# Patient Record
Sex: MALE
[De-identification: names, ages, dates, MRNs, and addresses within clinical notes are randomized per-mention and may not be internally consistent; named-entity substitution may affect disease eponyms.]

---

## 2017-08-22 ENCOUNTER — APPOINTMENT (OUTPATIENT)
Dept: GASTROENTEROLOGY | Facility: CLINIC | Age: 62
End: 2017-08-22
Payer: COMMERCIAL

## 2017-08-22 VITALS
DIASTOLIC BLOOD PRESSURE: 62 MMHG | TEMPERATURE: 99 F | HEIGHT: 67 IN | WEIGHT: 180 LBS | BODY MASS INDEX: 28.25 KG/M2 | SYSTOLIC BLOOD PRESSURE: 100 MMHG

## 2017-08-22 PROCEDURE — 99213 OFFICE O/P EST LOW 20 MIN: CPT

## 2018-01-13 ENCOUNTER — TRANSCRIPTION ENCOUNTER (OUTPATIENT)
Age: 63
End: 2018-01-13

## 2018-09-04 ENCOUNTER — APPOINTMENT (OUTPATIENT)
Dept: UROLOGY | Facility: CLINIC | Age: 63
End: 2018-09-04
Payer: COMMERCIAL

## 2018-09-04 PROCEDURE — 99204 OFFICE O/P NEW MOD 45 MIN: CPT

## 2018-09-06 ENCOUNTER — RECORD ABSTRACTING (OUTPATIENT)
Age: 63
End: 2018-09-06

## 2018-09-10 ENCOUNTER — OTHER (OUTPATIENT)
Age: 63
End: 2018-09-10

## 2018-09-15 ENCOUNTER — APPOINTMENT (OUTPATIENT)
Dept: MRI IMAGING | Facility: IMAGING CENTER | Age: 63
End: 2018-09-15

## 2018-09-21 LAB — PSA SERPL-MCNC: 7.45 NG/ML

## 2018-10-02 ENCOUNTER — APPOINTMENT (OUTPATIENT)
Dept: UROLOGY | Facility: CLINIC | Age: 63
End: 2018-10-02
Payer: COMMERCIAL

## 2018-10-02 ENCOUNTER — OUTPATIENT (OUTPATIENT)
Dept: OUTPATIENT SERVICES | Facility: HOSPITAL | Age: 63
LOS: 1 days | End: 2018-10-02
Payer: COMMERCIAL

## 2018-10-02 VITALS
HEART RATE: 57 BPM | WEIGHT: 180 LBS | TEMPERATURE: 98.6 F | HEIGHT: 67 IN | RESPIRATION RATE: 16 BRPM | DIASTOLIC BLOOD PRESSURE: 82 MMHG | BODY MASS INDEX: 28.25 KG/M2 | SYSTOLIC BLOOD PRESSURE: 125 MMHG

## 2018-10-02 DIAGNOSIS — R35.0 FREQUENCY OF MICTURITION: ICD-10-CM

## 2018-10-02 PROCEDURE — 76942 ECHO GUIDE FOR BIOPSY: CPT | Mod: 26,59

## 2018-10-02 PROCEDURE — 55700: CPT

## 2018-10-02 PROCEDURE — 76872 US TRANSRECTAL: CPT | Mod: 26

## 2018-10-02 PROCEDURE — 76872 US TRANSRECTAL: CPT

## 2018-10-02 PROCEDURE — 76942 ECHO GUIDE FOR BIOPSY: CPT | Mod: 59

## 2018-10-04 DIAGNOSIS — R97.20 ELEVATED PROSTATE SPECIFIC ANTIGEN [PSA]: ICD-10-CM

## 2018-10-08 LAB — CORE LAB BIOPSY: NORMAL

## 2019-02-01 ENCOUNTER — APPOINTMENT (OUTPATIENT)
Dept: ORTHOPEDIC SURGERY | Facility: CLINIC | Age: 64
End: 2019-02-01
Payer: COMMERCIAL

## 2019-02-01 VITALS
HEART RATE: 67 BPM | WEIGHT: 184 LBS | BODY MASS INDEX: 28.88 KG/M2 | HEIGHT: 67 IN | SYSTOLIC BLOOD PRESSURE: 123 MMHG | DIASTOLIC BLOOD PRESSURE: 76 MMHG

## 2019-02-01 DIAGNOSIS — Z87.898 PERSONAL HISTORY OF OTHER SPECIFIED CONDITIONS: ICD-10-CM

## 2019-02-01 DIAGNOSIS — Z86.39 PERSONAL HISTORY OF OTHER ENDOCRINE, NUTRITIONAL AND METABOLIC DISEASE: ICD-10-CM

## 2019-02-01 PROCEDURE — 72040 X-RAY EXAM NECK SPINE 2-3 VW: CPT

## 2019-02-01 PROCEDURE — 99204 OFFICE O/P NEW MOD 45 MIN: CPT

## 2019-02-01 NOTE — HISTORY OF PRESENT ILLNESS
[de-identified] : This 63-year-old male has a five-year history of intermittent neck symptoms. He had an MRI of the cervical spine in 2013. Currently he is having right-sided neck pain without associated arm pain. He has not had associated neurologic symptoms of numbness, paresthesias or weakness. There have been no changes in his gait or balance. The pain is not worse coughing or portion to move his bowels but once it was worse sneezing. The symptoms have increased in the last 3 months. He has been taking Aleve twice a day and seeing improvement. He has several daily habits that may well aggravate and propagate his neck related symptoms. He has a history of reflux symptoms for which he recently took a course of omeprazole with good results. [Pain Location] : pain [Improving] : improving [6] : a current pain level of 6/10

## 2019-02-01 NOTE — PHYSICAL EXAM
[de-identified] : He is fully alert and oriented with normal mood and affect. He is in no acute distress. He ambulates with a normal gait including tiptoe and heel walking. There is no evidence of unsteadiness or spasticity. There is no paravertebral muscle spasm, sciatic notch tenderness or trochanteric tenderness. There are no cutaneous abnormalities or palpable bony defects of the spine. There is no evidence of shortness of breath or respiratory distress. His lower extremity neurological examination revealed 1+ symmetrical reflexes were downgoing toes. And sensory exam is normal straight leg raising is negative to 90°. Vascular exam shows no evidence of varicosities there is no lymphedema. His knees have a full range of motion with normal stability. He has very prominent tibial tuberosities bilaterally from Osgood-Schlatter disease as an adolescent. Shoulder range of motion is full and painless. His upper extremity neurological examination revealed 1+ symmetrical reflexes were normal motor power and sensation. Range of motion of the cervical spine shows flexion with the chin reaching to within 2 fingerbreadths of the chest. Extension is to 65 or 70° of rotation of 70° bilaterally and tilt of 20°. [de-identified] : MRI of the cervical spine from 2013 revealed some discrete complexes and the space narrowing at C5-6 and C6-7 slightly worse towards the right. AP and flexion-extension lateral x-rays of the cervical spine today reveal no destructive changes. There are degenerative changes at C5-6 and C6-7. There is no evidence of instability.

## 2019-02-01 NOTE — REASON FOR VISIT
[Initial Visit] : an initial visit for [Degenerative Joint Disease] : degenerative joint disease [Neck Pain] : neck pain [FreeTextEntry2] : Neck pain

## 2019-02-01 NOTE — DISCUSSION/SUMMARY
[Medication Risks Reviewed] : Medication risks reviewed [de-identified] : His neck pain related to underlying degenerative changes. We discussed use of moist heat. He has several daily habits that may be aggravating and propagating the symptoms. He needs to change those habits. I have increased the Naprosyn to 500 mg twice a day along with omeprazole 20 mg once a day and I will see him for followup in 4 weeks

## 2019-02-22 ENCOUNTER — APPOINTMENT (OUTPATIENT)
Dept: ORTHOPEDIC SURGERY | Facility: CLINIC | Age: 64
End: 2019-02-22

## 2019-03-01 ENCOUNTER — APPOINTMENT (OUTPATIENT)
Dept: ORTHOPEDIC SURGERY | Facility: CLINIC | Age: 64
End: 2019-03-01
Payer: COMMERCIAL

## 2019-03-01 DIAGNOSIS — M47.812 SPONDYLOSIS W/OUT MYELOPATHY OR RADICULOPATHY, CERVICAL REGION: ICD-10-CM

## 2019-03-01 PROCEDURE — 99213 OFFICE O/P EST LOW 20 MIN: CPT

## 2019-03-01 NOTE — DISCUSSION/SUMMARY
[Medication Risks Reviewed] : Medication risks reviewed [de-identified] : He will finish the Naprosyn 500 mg twice a day. He is only a day or 2 of medication left. He will continue Prilosec. After the Naprosyn he will take 2 Aleve twice a day for 2 weeks and then lower the dose to one twice a day for 5 days after his symptoms have fully resolved. We again discussed activities he needs to avoid which may aggravate the symptoms and I will see him for followup on a p.r.n. basis.

## 2019-03-01 NOTE — HISTORY OF PRESENT ILLNESS
[de-identified] : He has tolerated the Naprosyn along with the Prilosec. Over the last 2 weeks he is much better with the most part only discomfort. He did fall asleep on the couch yesterday and his symptoms are worse today. [Pain Location] : pain [Improving] : improving [2] : a current pain level of 2/10 [Intermit.] : ~He/She~ states the symptoms seem to be intermittent

## 2019-03-01 NOTE — REASON FOR VISIT
[Follow-Up Visit] : a follow-up visit for [Degenerative Joint Disease] : degenerative joint disease [Neck Pain] : neck pain [FreeTextEntry2] : Neck pain

## 2020-03-02 ENCOUNTER — APPOINTMENT (OUTPATIENT)
Dept: UROLOGY | Facility: CLINIC | Age: 65
End: 2020-03-02
Payer: COMMERCIAL

## 2020-03-02 VITALS
SYSTOLIC BLOOD PRESSURE: 127 MMHG | HEART RATE: 65 BPM | RESPIRATION RATE: 16 BRPM | TEMPERATURE: 98.5 F | DIASTOLIC BLOOD PRESSURE: 77 MMHG

## 2020-03-02 DIAGNOSIS — Z78.9 OTHER SPECIFIED HEALTH STATUS: ICD-10-CM

## 2020-03-02 PROCEDURE — 99214 OFFICE O/P EST MOD 30 MIN: CPT

## 2020-03-02 RX ORDER — ATORVASTATIN CALCIUM 10 MG/1
10 TABLET, FILM COATED ORAL
Refills: 0 | Status: ACTIVE | COMMUNITY

## 2020-03-02 NOTE — ADDENDUM
[FreeTextEntry1] : Entered by Isaac Joel, acting as scribe for Dr. Shiv Caldwell.\par \par The documentation recorded by the scribe accurately reflects the service I personally performed and the decisions made by me.

## 2020-03-02 NOTE — LETTER BODY
[FreeTextEntry1] : Charles Ewing MD\par [address] \[place] [address]\par [phone]\par \par Dear Dr. Ewing,\par \par Reason for Visit: BPH. Elevated PSA\par \par This is a 64 year-old gentleman with elevated PSA and symptoms of BPH. Patient was last seen two years ago by Dr. Gale for elevated PSA. He obtained a negative prostate biopsy. Patient reports he has had weak uroflow, and urinary frequency for the last 1.5 years. He denies any hematuria or urinary incontinence. His symptoms are aggravated by hydration. He denies any alleviating factors. He has not tried any medical therapy previously. He reports no pain. His recent PSA was 6.9. All other review of systems are negative. He has no cancer in his family medical history. He has previous shoulder surgery. Past medical history, family history and social history were inquired and were noncontributory to current condition. The patient does not use tobacco. He drinks socially. Medications and allergies were reviewed. He is taking a Statin. He is allergic to Penicillin.\par \par On examination, the patient is a healthy-appearing gentleman in no acute distress. He is alert and oriented follows commands. He has normal mood and affect. He is normocephalic. Neck is supple. Oral no thrush Respirations are unlabored. His abdomen is soft and nontender. Bladder is nonpalpable. No CVA tenderness. Neurologically he is grossly intact. No peripheral edema. Skin without gross abnormality. He has normal male external genitalia. Normal meatus. Bilateral testes are descended intrascrotally and normal to palpation. On rectal examination, there is normal sphincter tone. The prostate is clinically benign without focal induration or nodularity.\par \par His PSA is 6.9, stable. His previous PSA was 7.5 in Sept. 2018. His PSA is stable.\par \par ASSESSMENT: BPH. Elevated PSA\par \par I counseled the patient on the various etiology of his symptoms. I discussed the natural history of BPH and the treatment options available. I discussed the options of conservative management with fluid in dietary restrictions, herbal therapy, medical therapy, and minimally invasive procedures.  Risk and benefits were discussed. I answered his questions. I recommended he try Flomax. I discussed the potential side effects of the medication. I counseled the patient on its use and side effects. If the patient develops any side effects, the patient will discontinue the medication and contact me. In terms of his elevated PSA,  I discussed with him the risk of occult malignancy. I discussed the various etiologies of PSA elevation, including enlargement of prostate, inflammation or infection, and prostate cancer. I reassured the patient as his PSA is stable. Risks and alternatives were discussed. I answered the patient questions. The patient will follow-up as directed and will contact me with any questions or concerns. Thank you for the opportunity to participate in the care of Mr. MACE. I will keep you updated on his progress.\par \par Plan: Trial of Flomax. Follow up in 1 month. \par \par

## 2020-03-02 NOTE — PHYSICAL EXAM
[General Appearance - Well Developed] : well developed [General Appearance - Well Nourished] : well nourished [Normal Appearance] : normal appearance [Well Groomed] : well groomed [General Appearance - In No Acute Distress] : no acute distress [Edema] : no peripheral edema [Exaggerated Use Of Accessory Muscles For Inspiration] : no accessory muscle use [Respiration, Rhythm And Depth] : normal respiratory rhythm and effort [Abdomen Soft] : soft [Abdomen Tenderness] : non-tender [Urethral Meatus] : meatus normal [Costovertebral Angle Tenderness] : no ~M costovertebral angle tenderness [Urinary Bladder Findings] : the bladder was normal on palpation [Scrotum] : the scrotum was normal [Testes Mass (___cm)] : there were no testicular masses [No Prostate Nodules] : no prostate nodules [Normal Station and Gait] : the gait and station were normal for the patient's age [] : no rash [Oriented To Time, Place, And Person] : oriented to person, place, and time [No Focal Deficits] : no focal deficits [Affect] : the affect was normal [Mood] : the mood was normal [Not Anxious] : not anxious [No Palpable Adenopathy] : no palpable adenopathy

## 2020-05-11 ENCOUNTER — APPOINTMENT (OUTPATIENT)
Dept: UROLOGY | Facility: CLINIC | Age: 65
End: 2020-05-11
Payer: COMMERCIAL

## 2020-05-11 VITALS — DIASTOLIC BLOOD PRESSURE: 80 MMHG | SYSTOLIC BLOOD PRESSURE: 132 MMHG | HEART RATE: 71 BPM | TEMPERATURE: 98.2 F

## 2020-05-11 PROCEDURE — 99214 OFFICE O/P EST MOD 30 MIN: CPT

## 2020-05-11 NOTE — LETTER BODY
[FreeTextEntry1] : Charles Ewing MD\par [address] \[place] [address]\par [phone]\par \par Dear Dr. Ewing,\par \par Reason for Visit: BPH. Elevated PSA. Urinary frequency.\par \par This is a 64 year-old gentleman with elevated PSA s/p negative prostate biopsy in 2018 and symptoms of BPH. Patient returns today for follow-up. Since his last visit, patient reports increase in nocturia and urinary frequency. He has strong uroflow and continues to take Flomax regularly without difficulty. He denies history of glaucoma. He denies any dysuria, hematuria, or urinary retention. All other review of systems are negative. He has previous shoulder surgery. Past medical history, family history and social history were unchanged. Medications and allergies were reviewed. He is taking a Statin. He is allergic to Penicillin.\par \par On examination, the patient is a healthy-appearing gentleman in no acute distress. He is alert and oriented follows commands. He has normal mood and affect. He is normocephalic. Neck is supple. Oral no thrush Respirations are unlabored. His abdomen is soft and nontender. Bladder is nonpalpable. No CVA tenderness. Neurologically he is grossly intact. No peripheral edema. Skin without gross abnormality. He has normal male external genitalia. Normal meatus. Bilateral testes are descended intrascrotally and normal to palpation. On rectal examination, there is normal sphincter tone. The prostate is clinically benign without focal induration or nodularity.\par \par His PSA is 6.9, stable. \par \par ASSESSMENT: BPH. Elevated PSA. Urinary frequency.\par \par I counseled the patient. In terms of his urinary frequency and nocturia, I counseled the patient on the various etiologies of his symptoms. Given the moderate symptoms, I recommend that the patient try a course of oxybutynin. He denies any history of glaucoma. I discussed the potential side effects of the medication. I counseled the patient on its use and side effects. If the patient develops any side effects, the patient will discontinue the medication and contact me. In terms of his BPH, patient reports strong uroflow while on Flomax. I renewed the patient's prescription today. I encouraged the patient to continue medications regularly as directed. Risks and alternatives were discussed. I answered the patient questions. The patient will follow-up as directed and will contact me with any questions or concerns. Thank you for the opportunity to participate in the care of Mr. MACE. I will keep you updated on his progress.\par \par Plan: Trial of Oxybutynin 5 mg. Continue Flomax. Follow up in 1 month.

## 2020-06-18 ENCOUNTER — APPOINTMENT (OUTPATIENT)
Dept: UROLOGY | Facility: CLINIC | Age: 65
End: 2020-06-18
Payer: COMMERCIAL

## 2020-06-18 VITALS — TEMPERATURE: 98.4 F

## 2020-06-18 VITALS — SYSTOLIC BLOOD PRESSURE: 116 MMHG | DIASTOLIC BLOOD PRESSURE: 76 MMHG | HEART RATE: 59 BPM

## 2020-06-18 PROCEDURE — 99214 OFFICE O/P EST MOD 30 MIN: CPT

## 2020-06-18 NOTE — LETTER BODY
[FreeTextEntry1] : Charles Ewing MD\par [address] \[place] [address]\par [phone]\par \par Dear Dr. Ewing,\par \par Reason for Visit: BPH. Elevated PSA. Urinary frequency.\par \par This is a 65 year-old gentleman with elevated PSA s/p negative prostate biopsy in 2018, symptoms of BPH and urinary frequency. Patient returns today for follow-up. Since his last visit, patient reports taking Flomax and Oxybutynin without any side effects or difficulties. He is doing well and reports improvement in his urinary frequency now that he is on Oxybutynin. He denies any other changes in health. All other review of systems are negative. Past medical history, family history and social history were unchanged. Medications and allergies were reviewed. He is taking a Statin. He is allergic to Penicillin.\par \par On examination, the patient is a healthy-appearing gentleman in no acute distress. He is alert and oriented follows commands. He has normal mood and affect. He is normocephalic. Neck is supple. Oral no thrush Respirations are unlabored. His abdomen is soft and nontender. Bladder is nonpalpable. No CVA tenderness. Neurologically he is grossly intact. No peripheral edema. Skin without gross abnormality. He has normal male external genitalia. Normal meatus. Bilateral testes are descended intrascrotally and normal to palpation. On rectal examination, there is normal sphincter tone. The prostate is clinically benign without focal induration or nodularity.\par \par His PSA in December 2019 was 6.9, stable. \par \par ASSESSMENT: BPH, stable on Flomax. Elevated PSA. Urinary frequency, improved on Oxybutynin 5 mg.\par \par I counseled the patient. He is overall well. I renewed the patient's prescription for Flomax and Oxybutynin today. I encouraged the patient to continue medications regularly as directed. Risks and alternatives were discussed. I answered the patient questions. Patient understands that if he develops gross hematuria or any urinary discomfort, he will contact me for further evaluation. The patient will follow-up as directed and will contact me with any questions or concerns. Thank you for the opportunity to participate in the care of Mr. MACE. I will keep you updated on his progress.\par \par Plan: Continue Flomax. Oxybutynin 5 mg. Follow up in 6 months.\par

## 2020-10-29 ENCOUNTER — NON-APPOINTMENT (OUTPATIENT)
Age: 65
End: 2020-10-29

## 2020-11-02 ENCOUNTER — APPOINTMENT (OUTPATIENT)
Dept: ORTHOPEDIC SURGERY | Facility: CLINIC | Age: 65
End: 2020-11-02
Payer: COMMERCIAL

## 2020-11-02 VITALS
BODY MASS INDEX: 24.8 KG/M2 | HEART RATE: 64 BPM | DIASTOLIC BLOOD PRESSURE: 70 MMHG | HEIGHT: 67 IN | WEIGHT: 158 LBS | SYSTOLIC BLOOD PRESSURE: 118 MMHG

## 2020-11-02 PROCEDURE — 99072 ADDL SUPL MATRL&STAF TM PHE: CPT

## 2020-11-02 PROCEDURE — 99203 OFFICE O/P NEW LOW 30 MIN: CPT

## 2020-11-02 PROCEDURE — 73030 X-RAY EXAM OF SHOULDER: CPT | Mod: RT

## 2020-12-17 ENCOUNTER — APPOINTMENT (OUTPATIENT)
Dept: UROLOGY | Facility: CLINIC | Age: 65
End: 2020-12-17
Payer: COMMERCIAL

## 2020-12-17 VITALS — HEART RATE: 66 BPM | SYSTOLIC BLOOD PRESSURE: 112 MMHG | DIASTOLIC BLOOD PRESSURE: 70 MMHG | TEMPERATURE: 97.1 F

## 2020-12-17 PROCEDURE — 99072 ADDL SUPL MATRL&STAF TM PHE: CPT

## 2020-12-17 PROCEDURE — 99214 OFFICE O/P EST MOD 30 MIN: CPT

## 2020-12-17 NOTE — LETTER BODY
[FreeTextEntry1] : Charles Ewing MD\par [address] \[place] [address]\par [phone]\par \par Dear Dr. Ewing,\par \par Reason for Visit: BPH. Elevated PSA. Urinary frequency.\par \par This is a 65 year-old gentleman with elevated PSA s/p negative prostate biopsy in 2018, symptoms of BPH and urinary frequency. He returns today for follow-up. Since he was last seen, the patient reports taking Flomax QD and Oxybutynin 5 mg without any side effects or difficulties with the medications. The patient reports of progressive urinary symptoms despite medical therapy. Patient denies any other changes in health. He denies any gross hematuria or any pain. All other review of systems are negative. Past medical history, family history and social history were unchanged. Medications and allergies were reviewed. He is taking a Statin. He is allergic to Penicillin.\par \par On examination, the patient is a healthy-appearing gentleman in no acute distress. He is alert and oriented follows commands. He has normal mood and affect. He is normocephalic. Neck is supple. Oral no thrush Respirations are unlabored. His abdomen is soft and nontender. Bladder is nonpalpable. No CVA tenderness. Neurologically he is grossly intact. No peripheral edema. Skin without gross abnormality. He has normal male external genitalia. Normal meatus. Bilateral testes are descended intrascrotally and normal to palpation. On rectal examination, there is normal sphincter tone. The prostate is clinically benign without focal induration or nodularity.\par \par ASSESSMENT: BPH, progressive on Flomax. Elevated PSA. Urinary frequency, progressive on Oxybutynin 5 mg.\par \par I counseled the patient. Since he has progressive urinary symptoms despite medical therapy, I recommended the patient undergo urodynamics testing with cystoscopy to evaluate his urinary outlet. I counseled the patient regarding the procedure. The risks and benefits were discussed. Alternatives were given. I answered the patient questions. The patient will proceed with urodynamics testing and cystoscopy. In terms of his elevated PSA, I recommended he repeat PSA today to ensure stability. He will also obtain BMP today to ensure stability. Risks and alternatives were discussed. I answered the patient questions. The patient will follow-up as directed and will contact me with any questions or concerns. Thank you for the opportunity to participate in the care of Mr. MACE. I will keep you updated on his progress.\par \par Plan: BMP. PSA. Urodynamics testing. Cystoscopy. Follow-up as directed.

## 2020-12-17 NOTE — ADDENDUM
[FreeTextEntry1] : Entered by Dyllan Mendoza, acting as scribe for Dr. Shiv Caldwell.\par \par The documentation recorded by the scribe accurately reflects the service I personally performed and the decisions made by me.\par

## 2020-12-18 LAB
ANION GAP SERPL CALC-SCNC: 12 MMOL/L
BUN SERPL-MCNC: 21 MG/DL
CALCIUM SERPL-MCNC: 9.4 MG/DL
CHLORIDE SERPL-SCNC: 100 MMOL/L
CO2 SERPL-SCNC: 27 MMOL/L
CREAT SERPL-MCNC: 1.32 MG/DL
GLUCOSE SERPL-MCNC: 78 MG/DL
POTASSIUM SERPL-SCNC: 4.7 MMOL/L
PSA FREE FLD-MCNC: 22 %
PSA FREE SERPL-MCNC: 2.13 NG/ML
PSA SERPL-MCNC: 9.7 NG/ML
SODIUM SERPL-SCNC: 139 MMOL/L

## 2020-12-18 RX ORDER — ENEMA 19; 7 G/133ML; G/133ML
7-19 ENEMA RECTAL
Qty: 2 | Refills: 0 | Status: ACTIVE | COMMUNITY
Start: 2020-12-18 | End: 1900-01-01

## 2021-01-28 ENCOUNTER — OUTPATIENT (OUTPATIENT)
Dept: OUTPATIENT SERVICES | Facility: HOSPITAL | Age: 66
LOS: 1 days | End: 2021-01-28
Payer: COMMERCIAL

## 2021-01-28 ENCOUNTER — APPOINTMENT (OUTPATIENT)
Dept: UROLOGY | Facility: CLINIC | Age: 66
End: 2021-01-28
Payer: COMMERCIAL

## 2021-01-28 VITALS
SYSTOLIC BLOOD PRESSURE: 146 MMHG | TEMPERATURE: 97.7 F | HEART RATE: 67 BPM | OXYGEN SATURATION: 99 % | DIASTOLIC BLOOD PRESSURE: 78 MMHG

## 2021-01-28 DIAGNOSIS — R35.0 FREQUENCY OF MICTURITION: ICD-10-CM

## 2021-01-28 PROCEDURE — 51784 ANAL/URINARY MUSCLE STUDY: CPT

## 2021-01-28 PROCEDURE — 51741 ELECTRO-UROFLOWMETRY FIRST: CPT | Mod: 26

## 2021-01-28 PROCEDURE — 99072 ADDL SUPL MATRL&STAF TM PHE: CPT

## 2021-01-28 PROCEDURE — 51798 US URINE CAPACITY MEASURE: CPT | Mod: 59

## 2021-01-28 PROCEDURE — 51797 INTRAABDOMINAL PRESSURE TEST: CPT | Mod: 26

## 2021-01-28 PROCEDURE — 51784 ANAL/URINARY MUSCLE STUDY: CPT | Mod: 26

## 2021-01-28 PROCEDURE — 99214 OFFICE O/P EST MOD 30 MIN: CPT | Mod: 25

## 2021-01-28 PROCEDURE — 51728 CYSTOMETROGRAM W/VP: CPT | Mod: 26

## 2021-01-28 PROCEDURE — 51728 CYSTOMETROGRAM W/VP: CPT

## 2021-01-28 PROCEDURE — 51797 INTRAABDOMINAL PRESSURE TEST: CPT

## 2021-01-28 PROCEDURE — 51741 ELECTRO-UROFLOWMETRY FIRST: CPT

## 2021-01-28 NOTE — LETTER BODY
[FreeTextEntry1] : Charles Ewing MD\par [address] \[place] [address]\par [phone]\par \par Dear Dr. Ewing,\par \par Reason for Visit: BPH. Elevated PSA. Urinary frequency.\par \par This is a 65 year-old gentleman with elevated PSA s/p negative prostate biopsy in 2018, symptoms of BPH and urinary frequency. The patient returns today for follow-up urodynamics testing and cystoscopy. Since his last visit, the patient underwent prostate in December 2020, which demonstrated an intermediate prostatic lesion, PI-RADS 3. The patient reports that he continues to take Flomax QD and Oxybutynin 5 mg regularly without any side effects or difficulties. However, he notes progressive urinary symptoms despite medical therapy. Patient denies any other changes in health. He denies any gross hematuria or any pain. He denies any history of glaucoma. All other review of systems are negative. Past medical history, family history and social history were unchanged. Medications and allergies were reviewed. He is taking a Statin. He is allergic to Penicillin.\par \par On examination, the patient is a healthy-appearing gentleman in no acute distress. He is alert and oriented follows commands. He has normal mood and affect. He is normocephalic. Neck is supple. Oral no thrush Respirations are unlabored. His abdomen is soft and nontender. Bladder is nonpalpable. No CVA tenderness. Neurologically he is grossly intact. No peripheral edema. Skin without gross abnormality. He has normal male external genitalia. Normal meatus. Bilateral testes are descended intrascrotally and normal to palpation. On rectal examination, there is normal sphincter tone. The prostate is clinically benign without focal induration or nodularity.\par \par His BMP demonstrated slightly decreased renal functions, creatinine 1.32. His PSA was 9.7, which is elevated.\par \par His prostate MRI in December 2020 demonstrated an intermediate prostatic lesion, PI-RADS 3.\par \par His cystoscopy today demonstrated a 3 cm prostatic urethra and a small median lobe. His urodynamics testing today demonstrated overactive bladder and no bladder outlet obstruction.\par \par ASSESSMENT: BPH, progressive on Flomax. Elevated PSA. Urinary frequency, progressive on Oxybutynin 5 mg. Overactive bladder.\par \par I counseled the patient. I discussed with the patient that his cystoscopy today demonstrated a 3 cm prostatic urethra and a small median lobe. His urodynamics testing today demonstrated overactive bladder without bladder outlet obstruction. I recommended he consider trying anticholinergic medication. The patient declines additional medical therapy, he wishes to continue with Flomax and Oxybutynin. I recommended the patient increase Oxybutynin to 10 mg and continue taking Flomax. I renewed the patient's prescription for Oxybutynin 10 mg and Flomax today. I encouraged the patient to continue medications regularly as directed. In terms of his elevated PSA, I discussed with the patient that his prostate MRI demonstrated an intermediate prostatic lesion, PI-RADS 3. He will follow-up in 6 months for PSA testing. Risks and alternatives were discussed. I answered the patient questions. The patient will follow-up as directed and will contact me with any questions or concerns. Thank you for the opportunity to participate in the care of Mr. MACE. I will keep you updated on his progress.\par \par Plan: Increase Oxybutynin to 10 mg. Continue Flomax QD. Follow-up in 6 months for PSA testing.

## 2021-02-03 DIAGNOSIS — M54.2 CERVICALGIA: ICD-10-CM

## 2021-02-03 DIAGNOSIS — D64.9 ANEMIA, UNSPECIFIED: ICD-10-CM

## 2021-02-03 DIAGNOSIS — R97.20 ELEVATED PROSTATE SPECIFIC ANTIGEN [PSA]: ICD-10-CM

## 2021-02-03 DIAGNOSIS — M19.011 PRIMARY OSTEOARTHRITIS, RIGHT SHOULDER: ICD-10-CM

## 2021-04-22 ENCOUNTER — APPOINTMENT (OUTPATIENT)
Dept: UROLOGY | Facility: CLINIC | Age: 66
End: 2021-04-22
Payer: COMMERCIAL

## 2021-04-22 PROCEDURE — 99072 ADDL SUPL MATRL&STAF TM PHE: CPT

## 2021-04-22 PROCEDURE — 99214 OFFICE O/P EST MOD 30 MIN: CPT

## 2021-04-22 NOTE — LETTER BODY
[FreeTextEntry1] : Charles Ewing MD\par [address] \[place] [address]\par [phone]\par \par Dear Dr. Ewing,\par \par Reason for Visit: BPH. Elevated PSA. Urinary frequency.\par \par This is a 65 year-old gentleman with elevated PSA s/p negative prostate biopsy in 2018, symptoms of BPH and urinary frequency. Patient's prostate MRI in Dec 2020 demonstrated an intermediate prostatic lesion, PI-RADS 3. His cystoscopy in January demonstrated a 3 cm prostatic urethra and a small median lobe. The patient's urodynamics testing in Jan demonstrated an overactive bladder without bladder outlet obstruction. He returns today for follow-up. Since he was last seen, the patient reports taking Flomax QD and Oxybutynin 10 mg regularly without any side effects or difficulties with the medications. The patient notes stable urine flow and improved urinary symptoms on medical therapy. Patient denies any other changes in health. The patient is doing well. He denies any gross hematuria or any pain. He denies any history of glaucoma. All other review of systems are negative. Past medical history, family history and social history were unchanged. Medications and allergies were reviewed. He is taking a Statin. He is allergic to Penicillin.\par \par On examination, the patient is a healthy-appearing gentleman in no acute distress. He is alert and oriented follows commands. He has normal mood and affect. He is normocephalic. Neck is supple. Oral no thrush Respirations are unlabored. His abdomen is soft and nontender. Bladder is nonpalpable. No CVA tenderness. Neurologically he is grossly intact. No peripheral edema. Skin without gross abnormality. He has normal male external genitalia. Normal meatus. Bilateral testes are descended intrascrotally and normal to palpation. On rectal examination, there is normal sphincter tone. The prostate is clinically benign without focal induration or nodularity.\par \par ASSESSMENT: BPH, stable on Flomax. Elevated PSA. Urinary frequency, improved on Oxybutynin 10 mg. Overactive bladder.\par \par I counseled the patient. He is doing well. In terms of his BPH, since his symptoms are stable, I recommended the patient continue taking Flomax QD. In terms of his urinary frequency, his symptoms have improved on medical therapy. I recommended he continue taking Oxybutynin 10 mg. I encouraged the patient to continue medications regularly as directed. In terms of his elevated PSA, I recommended he repeat PSA and BMP today to ensure stability. Risks and alternatives were discussed. I answered the patient questions. The patient will follow-up in 1 year and will contact me with any questions or concerns. Thank you for the opportunity to participate in the care of Mr. MACE. I will keep you updated on his progress.\par \par Plan: Continue Oxybutynin 10 mg and Flomax QD. BMP. PSA. Follow-up in 1 year.

## 2021-11-10 ENCOUNTER — APPOINTMENT (OUTPATIENT)
Dept: GASTROENTEROLOGY | Facility: CLINIC | Age: 66
End: 2021-11-10
Payer: COMMERCIAL

## 2021-11-10 VITALS
DIASTOLIC BLOOD PRESSURE: 70 MMHG | OXYGEN SATURATION: 99 % | BODY MASS INDEX: 25.43 KG/M2 | HEIGHT: 67 IN | SYSTOLIC BLOOD PRESSURE: 110 MMHG | WEIGHT: 162 LBS | HEART RATE: 63 BPM | TEMPERATURE: 97.5 F

## 2021-11-10 DIAGNOSIS — K92.1 MELENA: ICD-10-CM

## 2021-11-10 PROCEDURE — 99203 OFFICE O/P NEW LOW 30 MIN: CPT

## 2021-11-10 RX ORDER — SODIUM SULFATE, POTASSIUM SULFATE, MAGNESIUM SULFATE 17.5; 3.13; 1.6 G/ML; G/ML; G/ML
17.5-3.13-1.6 SOLUTION, CONCENTRATE ORAL
Qty: 1 | Refills: 0 | Status: ACTIVE | COMMUNITY
Start: 2021-11-10 | End: 1900-01-01

## 2021-11-10 NOTE — HISTORY OF PRESENT ILLNESS
[FreeTextEntry1] : Patient came to the office today with paperwork demonstrating positive fecal occult blood testing. Patient feels well offers no complaints.  He denies current nausea vomiting fever chills rectal bleeding or melena. He denies cardiac or pulmonary complaints. Patient has a history of frequent blood donation. Patient does have chronic sciatica pain as well as some nocturia.

## 2021-11-10 NOTE — ASSESSMENT
[FreeTextEntry1] : Impression and plan\par Patient came to the office with recent positive fit test. He is asymptomatic. I will recommend further testing in the form of upper endoscopy and colonoscopy followed perhaps by a capsule camera endoscopy if required. The risks benefits alternatives and limitations were discussed. Patient will schedule at his convenience.

## 2021-12-05 ENCOUNTER — TRANSCRIPTION ENCOUNTER (OUTPATIENT)
Age: 66
End: 2021-12-05

## 2021-12-09 ENCOUNTER — APPOINTMENT (OUTPATIENT)
Dept: GASTROENTEROLOGY | Facility: AMBULATORY MEDICAL SERVICES | Age: 66
End: 2021-12-09
Payer: COMMERCIAL

## 2021-12-09 PROCEDURE — 45378 DIAGNOSTIC COLONOSCOPY: CPT

## 2021-12-09 PROCEDURE — 43239 EGD BIOPSY SINGLE/MULTIPLE: CPT

## 2021-12-29 ENCOUNTER — NON-APPOINTMENT (OUTPATIENT)
Age: 66
End: 2021-12-29

## 2022-01-20 ENCOUNTER — RX RENEWAL (OUTPATIENT)
Age: 67
End: 2022-01-20

## 2022-02-01 ENCOUNTER — APPOINTMENT (OUTPATIENT)
Dept: GASTROENTEROLOGY | Facility: CLINIC | Age: 67
End: 2022-02-01
Payer: COMMERCIAL

## 2022-02-01 VITALS
WEIGHT: 165 LBS | BODY MASS INDEX: 32.39 KG/M2 | DIASTOLIC BLOOD PRESSURE: 70 MMHG | HEART RATE: 68 BPM | OXYGEN SATURATION: 99 % | HEIGHT: 60 IN | SYSTOLIC BLOOD PRESSURE: 118 MMHG | TEMPERATURE: 97.7 F

## 2022-02-01 PROCEDURE — 99213 OFFICE O/P EST LOW 20 MIN: CPT

## 2022-02-01 NOTE — HISTORY OF PRESENT ILLNESS
[FreeTextEntry1] : Patient returns for followup and discussion. He has a history of positive stool test and underwent upper endoscopy and colonoscopy with negative results. I advised the patient to return today for repeat stool testing as well as blood work. It was our conversation that we would test these before embarking upon capsule camera. Patient offers no complaints of nausea vomiting fever chills rectal bleeding or melena.

## 2022-02-01 NOTE — ASSESSMENT
[FreeTextEntry1] : Impression and plan\par \par Patient came to the office today for followup and discussion. He was found to have positive stool test an upper endoscopy and colonoscopy were performed for that reason.oth studies were unrevealing.I asked patient to come back today to repeat stool testing andblood work including iron studies.If either of these are abnormal then I will proceed with capsule camera. Patient will call back for resultsof testing.

## 2022-02-03 LAB
BASOPHILS # BLD AUTO: 0.04 K/UL
BASOPHILS NFR BLD AUTO: 0.6 %
EOSINOPHIL # BLD AUTO: 0.21 K/UL
EOSINOPHIL NFR BLD AUTO: 3 %
FERRITIN SERPL-MCNC: 66 NG/ML
HCT VFR BLD CALC: 43.6 %
HGB BLD-MCNC: 14.1 G/DL
IMM GRANULOCYTES NFR BLD AUTO: 0.3 %
IRON SATN MFR SERPL: 20 %
IRON SERPL-MCNC: 74 UG/DL
LYMPHOCYTES # BLD AUTO: 2.14 K/UL
LYMPHOCYTES NFR BLD AUTO: 31 %
MAN DIFF?: NORMAL
MCHC RBC-ENTMCNC: 30.2 PG
MCHC RBC-ENTMCNC: 32.3 GM/DL
MCV RBC AUTO: 93.4 FL
MONOCYTES # BLD AUTO: 0.38 K/UL
MONOCYTES NFR BLD AUTO: 5.5 %
NEUTROPHILS # BLD AUTO: 4.12 K/UL
NEUTROPHILS NFR BLD AUTO: 59.6 %
PLATELET # BLD AUTO: 209 K/UL
RBC # BLD: 4.67 M/UL
RBC # FLD: 12.4 %
TIBC SERPL-MCNC: 373 UG/DL
UIBC SERPL-MCNC: 299 UG/DL
WBC # FLD AUTO: 6.91 K/UL

## 2022-02-08 ENCOUNTER — NON-APPOINTMENT (OUTPATIENT)
Age: 67
End: 2022-02-08

## 2022-03-09 ENCOUNTER — APPOINTMENT (OUTPATIENT)
Dept: GASTROENTEROLOGY | Facility: CLINIC | Age: 67
End: 2022-03-09
Payer: COMMERCIAL

## 2022-03-09 VITALS
HEIGHT: 60 IN | SYSTOLIC BLOOD PRESSURE: 110 MMHG | WEIGHT: 164 LBS | DIASTOLIC BLOOD PRESSURE: 70 MMHG | TEMPERATURE: 97.7 F | BODY MASS INDEX: 32.2 KG/M2

## 2022-03-09 DIAGNOSIS — Z00.00 ENCOUNTER FOR GENERAL ADULT MEDICAL EXAMINATION W/OUT ABNORMAL FINDINGS: ICD-10-CM

## 2022-03-09 DIAGNOSIS — D64.9 ANEMIA, UNSPECIFIED: ICD-10-CM

## 2022-03-09 PROCEDURE — 99213 OFFICE O/P EST LOW 20 MIN: CPT

## 2022-03-23 LAB
BASOPHILS # BLD AUTO: 0.05 K/UL
BASOPHILS NFR BLD AUTO: 0.8 %
EOSINOPHIL # BLD AUTO: 0.21 K/UL
EOSINOPHIL NFR BLD AUTO: 3.4 %
FERRITIN SERPL-MCNC: 67 NG/ML
HCT VFR BLD CALC: 44 %
HGB BLD-MCNC: 14.1 G/DL
IMM GRANULOCYTES NFR BLD AUTO: 0.2 %
IRON SATN MFR SERPL: 14 %
IRON SERPL-MCNC: 54 UG/DL
LYMPHOCYTES # BLD AUTO: 1.81 K/UL
LYMPHOCYTES NFR BLD AUTO: 29.7 %
MAN DIFF?: NORMAL
MCHC RBC-ENTMCNC: 29.6 PG
MCHC RBC-ENTMCNC: 32 GM/DL
MCV RBC AUTO: 92.4 FL
MONOCYTES # BLD AUTO: 0.39 K/UL
MONOCYTES NFR BLD AUTO: 6.4 %
NEUTROPHILS # BLD AUTO: 3.63 K/UL
NEUTROPHILS NFR BLD AUTO: 59.5 %
PLATELET # BLD AUTO: 226 K/UL
RBC # BLD: 4.76 M/UL
RBC # FLD: 12.1 %
TIBC SERPL-MCNC: 392 UG/DL
UIBC SERPL-MCNC: 338 UG/DL
WBC # FLD AUTO: 6.1 K/UL

## 2022-03-23 NOTE — HISTORY OF PRESENT ILLNESS
[FreeTextEntry1] : Patient returns for followup and discussion. Today I will order repeat CBC. Stool guaiac testing was negative x6   recent CBC was normal along with iron studies. Recent upper endoscopy and colonoscopy performed. No obvious source of bleeding noted.

## 2022-03-23 NOTE — ASSESSMENT
[FreeTextEntry1] : Impression and plan\par \par Patient came to the office today for follow up and discussion. There was an investigation performed including upper endoscopy and colonoscopy for evaluation of positive stool test. These examinations were unrevealing. CBC is normal  Iron studies are normal stool guaiac testing is negative at this time.As such I will hold off on further investigation i.e. capsule camera and has patient return in 90 days to repeat blood work. He will call back if any note of rectal bleeding melena et cetera.

## 2022-09-08 ENCOUNTER — APPOINTMENT (OUTPATIENT)
Dept: UROLOGY | Facility: CLINIC | Age: 67
End: 2022-09-08

## 2022-09-08 PROCEDURE — 99214 OFFICE O/P EST MOD 30 MIN: CPT

## 2022-09-08 RX ORDER — OXYBUTYNIN CHLORIDE 10 MG/1
10 TABLET, EXTENDED RELEASE ORAL
Qty: 90 | Refills: 3 | Status: DISCONTINUED | COMMUNITY
Start: 2020-05-11 | End: 2022-09-08

## 2022-09-11 NOTE — LETTER BODY
[FreeTextEntry1] : Charles Ewing MD\par [address] \[place] [address]\par [phone]\par \par Dear Dr. Ewing,\par \par Reason for Visit: BPH. Elevated PSA. Urinary frequency.\par \par This is a 67 year-old gentleman with elevated PSA s/p negative prostate biopsy in 2018, symptoms of BPH and urinary frequency. Patient's prostate MRI in Dec 2020 demonstrated an intermediate prostatic lesion, PI-RADS 3. His cystoscopy in January demonstrated a 3 cm prostatic urethra and a small median lobe. The patient's urodynamics testing in Jan demonstrated an overactive bladder without bladder outlet obstruction. He returns today for follow-up. Since he was last seen, the patient reports taking Flomax QD and Oxybutynin 10 mg regularly without any side effects or difficulties with the medications. The patient notes stable urine flow and improved urinary symptoms on medical therapy. Patient denies any other changes in health. The patient is doing well. He denies any gross hematuria or any pain. He denies any history of glaucoma. All other review of systems are negative. Past medical history, family history and social history were unchanged. Medications and allergies were reviewed. He is taking a Statin. He is allergic to Penicillin.\par \par On examination, the patient is in no acute distress. He is alert and oriented follows commands. He has normal mood and affect. He is normocephalic. Neck is supple. Oral no thrush Respirations are unlabored. His abdomen is soft and nontender. Bladder is nonpalpable. No CVA tenderness. Neurologically he is grossly intact. No peripheral edema. Skin without gross abnormality. He has normal male external genitalia. Normal meatus. Bilateral testes are descended intrascrotally and normal to palpation. On rectal examination, there is normal sphincter tone. The prostate is clinically benign without focal induration or nodularity.\par \par ASSESSMENT: BPH, stable on Flomax. Elevated PSA. Urinary frequency, improved on Oxybutynin 10 mg. Overactive bladder.\par \par I counseled the patient. He is doing well. In terms of his BPH, since his symptoms are stable, I recommended the patient continue taking Flomax QD. In terms of his urinary frequency, his symptoms have improved on medical therapy. I recommended he continue taking Oxybutynin 10 mg. I encouraged the patient to continue medications regularly as directed. In terms of his elevated PSA, I recommended he repeat PSA and BMP today to ensure stability. Risks and alternatives were discussed. I answered the patient questions. The patient will follow-up in 1 year and will contact me with any questions or concerns. Thank you for the opportunity to participate in the care of Mr. MACE. I will keep you updated on his progress.\par \par Plan: Continue Oxybutynin 10 mg and Flomax QD. BMP. PSA. Follow-up in 1 year. \par

## 2022-09-11 NOTE — HISTORY OF PRESENT ILLNESS
[FreeTextEntry1] : Follow-up BPH.  Continue Flomax.  Follow urinary frequency.  Continue oxybutynin.\par \par Previous elevated PSA.  Follow-up 1 year.

## 2023-08-16 ENCOUNTER — RX RENEWAL (OUTPATIENT)
Age: 68
End: 2023-08-16

## 2023-09-14 ENCOUNTER — APPOINTMENT (OUTPATIENT)
Dept: UROLOGY | Facility: CLINIC | Age: 68
End: 2023-09-14
Payer: COMMERCIAL

## 2023-09-14 VITALS
OXYGEN SATURATION: 99 % | DIASTOLIC BLOOD PRESSURE: 74 MMHG | SYSTOLIC BLOOD PRESSURE: 122 MMHG | RESPIRATION RATE: 16 BRPM | HEART RATE: 72 BPM

## 2023-09-14 PROCEDURE — 51798 US URINE CAPACITY MEASURE: CPT

## 2023-09-14 PROCEDURE — 99214 OFFICE O/P EST MOD 30 MIN: CPT

## 2023-09-17 LAB
APPEARANCE: CLEAR
BACTERIA UR CULT: NORMAL
BACTERIA: NEGATIVE /HPF
BILIRUBIN URINE: NEGATIVE
BLOOD URINE: NEGATIVE
CAST: 0 /LPF
COLOR: YELLOW
EPITHELIAL CELLS: 0 /HPF
GLUCOSE QUALITATIVE U: NEGATIVE MG/DL
KETONES URINE: NEGATIVE MG/DL
LEUKOCYTE ESTERASE URINE: NEGATIVE
MICROSCOPIC-UA: NORMAL
NITRITE URINE: NEGATIVE
PH URINE: 6
PROTEIN URINE: NEGATIVE MG/DL
RED BLOOD CELLS URINE: 0 /HPF
SPECIFIC GRAVITY URINE: 1.02
UROBILINOGEN URINE: 0.2 MG/DL
WHITE BLOOD CELLS URINE: 0 /HPF

## 2023-10-19 ENCOUNTER — APPOINTMENT (OUTPATIENT)
Dept: UROLOGY | Facility: CLINIC | Age: 68
End: 2023-10-19

## 2024-01-20 LAB
APPEARANCE: CLEAR
BACTERIA: NEGATIVE /HPF
BILIRUBIN URINE: NEGATIVE
BLOOD URINE: NEGATIVE
CAST: 0 /LPF
COLOR: YELLOW
EPITHELIAL CELLS: 0 /HPF
GLUCOSE QUALITATIVE U: NEGATIVE MG/DL
KETONES URINE: NEGATIVE MG/DL
LEUKOCYTE ESTERASE URINE: NEGATIVE
MICROSCOPIC-UA: NORMAL
NITRITE URINE: NEGATIVE
PH URINE: 5.5
PROTEIN URINE: NEGATIVE MG/DL
RED BLOOD CELLS URINE: 2 /HPF
SPECIFIC GRAVITY URINE: 1.03
UROBILINOGEN URINE: 0.2 MG/DL
WHITE BLOOD CELLS URINE: 0 /HPF

## 2024-01-21 LAB — BACTERIA UR CULT: NORMAL

## 2024-01-24 ENCOUNTER — NON-APPOINTMENT (OUTPATIENT)
Age: 69
End: 2024-01-24

## 2024-02-01 ENCOUNTER — APPOINTMENT (OUTPATIENT)
Dept: UROLOGY | Facility: CLINIC | Age: 69
End: 2024-02-01
Payer: COMMERCIAL

## 2024-02-01 DIAGNOSIS — M19.011 PRIMARY OSTEOARTHRITIS, RIGHT SHOULDER: ICD-10-CM

## 2024-02-01 LAB
PSA FREE FLD-MCNC: 20 %
PSA FREE SERPL-MCNC: 2.28 NG/ML
PSA SERPL-MCNC: 11.2 NG/ML

## 2024-02-01 PROCEDURE — 99214 OFFICE O/P EST MOD 30 MIN: CPT

## 2024-02-01 PROCEDURE — G2211 COMPLEX E/M VISIT ADD ON: CPT

## 2024-02-01 RX ORDER — SODIUM PHOSPHATE, DIBASIC AND SODIUM PHOSPHATE, MONOBASIC 7; 19 G/230ML; G/230ML
ENEMA RECTAL
Qty: 1 | Refills: 0 | Status: ACTIVE | COMMUNITY
Start: 2024-02-01 | End: 1900-01-01

## 2024-02-01 RX ORDER — TAMSULOSIN HYDROCHLORIDE 0.4 MG/1
0.4 CAPSULE ORAL
Qty: 180 | Refills: 3 | Status: ACTIVE | COMMUNITY
Start: 2020-03-02 | End: 1900-01-01

## 2024-02-01 NOTE — LETTER BODY
[FreeTextEntry1] : Charles Ewing  [address] [place], [place] [id number] [phone]   Dear Dr. Ewing,   Reason for Visit: BPH. Elevated PSA. Urinary frequency.   This is a 68 year-old gentleman with elevated PSA s/p negative prostate biopsy in 2018, symptoms of BPH and urinary frequency. Patient's prostate MRI in Dec 2020 demonstrated an intermediate prostatic lesion, PI-RADS 3. His cystoscopy in January demonstrated a 3 cm prostatic urethra and a small median lobe. The patient's urodynamics testing in Jan demonstrated an overactive bladder without bladder outlet obstruction. He returns today for follow-up. Since he was last seen, the patient reports taking Flomax BID regularly without any side effects or difficulties with the medications. The patient notes improved urinary symptoms on medical therapy. Patient denies any other changes in health. The patient is doing well. He denies any gross hematuria or any pain. He denies any history of glaucoma. All other review of systems are negative. Past medical history, family history and social history were unchanged. Medications and allergies were reviewed. He is taking a Statin. He is allergic to Penicillin.    On examination, the patient is in no acute distress. He is alert and oriented follows commands. He has normal mood and affect. He is normocephalic. Neck is supple. Oral no thrush Respirations are unlabored. His abdomen is soft and nontender. Bladder is nonpalpable. No CVA tenderness. Neurologically he is grossly intact. No peripheral edema. Skin without gross abnormality. He has normal male external genitalia. Normal meatus. Bilateral testes are descended intrascrotally and normal to palpation. On rectal examination, there is normal sphincter tone. The prostate is clinically benign without focal induration or nodularity.   Post-void residual on bladder scan today was 180 cc, which improved. Previous was 264 cc.   ASSESSMENT: BPH, stable on Flomax. Elevated PSA. Urinary frequency, improved on Oxybutynin 10 mg. Overactive bladder.    I counseled the patient. In terms of his BPH, his symptoms are improved on medical therapy. His PVR today was 180 cc which improved. Patient declines further intervention. I recommended he continue taking Flomax BID. I encouraged the patient to continue medications regularly as directed. In terms of his elevated PSA, I recommended he repeat PSA and BMP today to ensure stability. Risks and alternatives were discussed. I answered the patient questions. The patient will follow-up in 1 year and will contact me with any questions or concerns. Thank you for the opportunity to participate in the care of Mr. MACE. I will keep you updated on his progress.    Plan: Continue Flomax BID. BMP. PSA. Follow up in 6 months.  Review PSA 11.2.  Prostate MRI.

## 2024-02-01 NOTE — ADDENDUM
[FreeTextEntry1] : Entered by Laurence Ramos, acting as scribe for Dr. Shiv Caldwell. The documentation recorded by the scribe accurately reflects the service I personally performed and the decisions made by me.

## 2024-02-01 NOTE — HISTORY OF PRESENT ILLNESS
[FreeTextEntry1] : Follow-up BPH.  Flomax twice daily.  PVR today was 180.  Improved.  Patient declines intervention.  Continue Flomax.  Follow-up 6 months.  Follow-up elevated PSA.  Repeat PSA.  Please refer to URO Consult note

## 2024-02-02 LAB
ANION GAP SERPL CALC-SCNC: 11 MMOL/L
BUN SERPL-MCNC: 17 MG/DL
CALCIUM SERPL-MCNC: 9.1 MG/DL
CHLORIDE SERPL-SCNC: 103 MMOL/L
CO2 SERPL-SCNC: 28 MMOL/L
CREAT SERPL-MCNC: 1.02 MG/DL
EGFR: 81 ML/MIN/1.73M2
GLUCOSE SERPL-MCNC: 88 MG/DL
POTASSIUM SERPL-SCNC: 4.3 MMOL/L
SODIUM SERPL-SCNC: 142 MMOL/L

## 2024-02-08 ENCOUNTER — NON-APPOINTMENT (OUTPATIENT)
Age: 69
End: 2024-02-08

## 2024-02-16 ENCOUNTER — APPOINTMENT (OUTPATIENT)
Dept: MRI IMAGING | Facility: CLINIC | Age: 69
End: 2024-02-16
Payer: COMMERCIAL

## 2024-02-16 ENCOUNTER — RESULT REVIEW (OUTPATIENT)
Age: 69
End: 2024-02-16

## 2024-02-16 PROCEDURE — 76498P: CUSTOM

## 2024-02-16 PROCEDURE — A9585: CPT

## 2024-02-16 PROCEDURE — 72197 MRI PELVIS W/O & W/DYE: CPT

## 2024-02-23 DIAGNOSIS — R33.8 BENIGN PROSTATIC HYPERPLASIA WITH LOWER URINARY TRACT SYMPMS: ICD-10-CM

## 2024-02-23 DIAGNOSIS — N40.1 BENIGN PROSTATIC HYPERPLASIA WITH LOWER URINARY TRACT SYMPMS: ICD-10-CM

## 2024-02-23 RX ORDER — SODIUM PHOSPHATE, DIBASIC AND SODIUM PHOSPHATE, MONOBASIC 7; 19 G/230ML; G/230ML
ENEMA RECTAL
Qty: 1 | Refills: 0 | Status: ACTIVE | COMMUNITY
Start: 2024-02-23 | End: 1900-01-01

## 2024-02-26 ENCOUNTER — RESULT REVIEW (OUTPATIENT)
Age: 69
End: 2024-02-26

## 2024-02-27 PROCEDURE — 76377 3D RENDER W/INTRP POSTPROCES: CPT

## 2024-02-28 ENCOUNTER — NON-APPOINTMENT (OUTPATIENT)
Age: 69
End: 2024-02-28

## 2024-03-17 ENCOUNTER — NON-APPOINTMENT (OUTPATIENT)
Age: 69
End: 2024-03-17

## 2024-03-17 DIAGNOSIS — M54.2 CERVICALGIA: ICD-10-CM

## 2024-03-17 DIAGNOSIS — R97.20 ELEVATED PROSTATE, SPECIFIC ANTIGEN [PSA]: ICD-10-CM

## 2024-03-18 ENCOUNTER — OUTPATIENT (OUTPATIENT)
Dept: OUTPATIENT SERVICES | Facility: HOSPITAL | Age: 69
LOS: 1 days | End: 2024-03-18
Payer: COMMERCIAL

## 2024-03-18 ENCOUNTER — APPOINTMENT (OUTPATIENT)
Dept: UROLOGY | Facility: CLINIC | Age: 69
End: 2024-03-18
Payer: COMMERCIAL

## 2024-03-18 VITALS — DIASTOLIC BLOOD PRESSURE: 69 MMHG | SYSTOLIC BLOOD PRESSURE: 147 MMHG | HEART RATE: 64 BPM

## 2024-03-18 VITALS — SYSTOLIC BLOOD PRESSURE: 120 MMHG | DIASTOLIC BLOOD PRESSURE: 74 MMHG | HEART RATE: 59 BPM

## 2024-03-18 VITALS — DIASTOLIC BLOOD PRESSURE: 71 MMHG | HEART RATE: 73 BPM | SYSTOLIC BLOOD PRESSURE: 133 MMHG

## 2024-03-18 PROCEDURE — 76999 ECHO EXAMINATION PROCEDURE: CPT

## 2024-03-18 PROCEDURE — 55700: CPT

## 2024-03-18 PROCEDURE — 76999F: CUSTOM | Mod: 26

## 2024-03-19 RX ORDER — OMEPRAZOLE 20 MG/1
20 CAPSULE, DELAYED RELEASE ORAL DAILY
Qty: 30 | Refills: 1 | Status: DISCONTINUED | COMMUNITY
Start: 2019-03-01 | End: 2024-03-19

## 2024-03-19 RX ORDER — CEFDINIR 300 MG/1
300 CAPSULE ORAL
Qty: 2 | Refills: 0 | Status: DISCONTINUED | COMMUNITY
Start: 2018-09-06 | End: 2024-03-19

## 2024-03-19 RX ORDER — NAPROXEN 500 MG/1
500 TABLET ORAL
Qty: 60 | Refills: 0 | Status: DISCONTINUED | COMMUNITY
Start: 2019-02-01 | End: 2024-03-19

## 2024-03-23 LAB — PROSTATE BIOPSY: NORMAL

## 2024-03-28 DIAGNOSIS — R97.20 ELEVATED PROSTATE SPECIFIC ANTIGEN [PSA]: ICD-10-CM

## 2024-07-31 DIAGNOSIS — R97.20 ELEVATED PROSTATE, SPECIFIC ANTIGEN [PSA]: ICD-10-CM

## 2024-08-01 ENCOUNTER — TRANSCRIPTION ENCOUNTER (OUTPATIENT)
Age: 69
End: 2024-08-01

## 2024-08-01 ENCOUNTER — APPOINTMENT (OUTPATIENT)
Dept: UROLOGY | Facility: CLINIC | Age: 69
End: 2024-08-01
Payer: COMMERCIAL

## 2024-08-01 VITALS
WEIGHT: 172 LBS | DIASTOLIC BLOOD PRESSURE: 71 MMHG | HEART RATE: 59 BPM | SYSTOLIC BLOOD PRESSURE: 115 MMHG | BODY MASS INDEX: 26.07 KG/M2 | HEIGHT: 68 IN

## 2024-08-01 DIAGNOSIS — K92.1 MELENA: ICD-10-CM

## 2024-08-01 DIAGNOSIS — N40.1 BENIGN PROSTATIC HYPERPLASIA WITH LOWER URINARY TRACT SYMPMS: ICD-10-CM

## 2024-08-01 DIAGNOSIS — R33.8 BENIGN PROSTATIC HYPERPLASIA WITH LOWER URINARY TRACT SYMPMS: ICD-10-CM

## 2024-08-01 LAB
LH SERPL-ACNC: 5.7 IU/L
PROLACTIN SERPL-MCNC: 7.6 NG/ML

## 2024-08-01 PROCEDURE — 99214 OFFICE O/P EST MOD 30 MIN: CPT

## 2024-08-01 PROCEDURE — G2211 COMPLEX E/M VISIT ADD ON: CPT | Mod: NC

## 2024-08-02 LAB
TESTOST FREE SERPL-MCNC: 3.1 PG/ML
TESTOST SERPL-MCNC: 547 NG/DL

## 2024-08-03 NOTE — LETTER BODY
[FreeTextEntry1] : Charles Ewing  [address] [place], [place] [id number] [phone]   Dear Dr. Ewing,   Reason for Visit: BPH. Elevated PSA. Urinary frequency.   This is a 69-year-old gentleman with elevated PSA s/p negative prostate biopsy in 2018, symptoms of BPH and urinary frequency. Patient's prostate MRI in Dec 2020 demonstrated an intermediate prostatic lesion, PI-RADS 3. His cystoscopy in January demonstrated a 3 cm prostatic urethra and a small median lobe. The patient's urodynamics testing in Jan demonstrated an overactive bladder without bladder outlet obstruction. He returns today for follow-up. Since he was last seen, the patient obtained a negative prostate biopsy in March of this year. He denies any difficulties after the biopsy. He reports taking Flomax BID regularly without any side effects or difficulties with the medications. The patient notes improved urinary symptoms on medical therapy. The patient expresses concerns of fatigue. The patient is doing well. He denies any gross hematuria or any pain. He denies any history of glaucoma. All other review of systems are negative. Past medical history, family history and social history were unchanged. Medications and allergies were reviewed. He is taking a Statin. He is allergic to Penicillin.    On examination, the patient is in no acute distress. He is alert and oriented follows commands. He has normal mood and affect. He is normocephalic. Neck is supple. Oral no thrush Respirations are unlabored. His abdomen is soft and nontender. Bladder is nonpalpable. No CVA tenderness. Neurologically he is grossly intact. No peripheral edema. Skin without gross abnormality. He has normal male external genitalia. Normal meatus. Bilateral testes are descended intrascrotally and normal to palpation. On rectal examination, there is normal sphincter tone. The prostate is clinically benign without focal induration or nodularity.   His prostate biopsy in March 2024 was negative.   ASSESSMENT: BPH, stable on Flomax. Elevated PSA. Urinary frequency, improved on Oxybutynin 10 mg. Overactive bladder. Fatigue    I counseled the patient. In terms of his BPH, his symptoms are improved on medical therapy. I recommended he continue taking Flomax BID.  I renewed the patient's prescription for Flomax BID today. I encouraged the patient to continue medications regularly as directed. In terms of his elevated PSA, his previous prostate biopsy was negative. He will repeat PSA and BMP to ensure stability. In terms of his concerns for fatigue, I recommended that he obtain testosterone, prolactin, and LH level. Risks and alternatives were discussed. I answered the patient questions. The patient will follow-up in 1 year and will contact me with any questions or concerns. Thank you for the opportunity to participate in the care of Mr. MACE. I will keep you updated on his progress.    Plan: Continue Flomax BID. BMP. PSA. Testosterone. Prolactin. LH levels. Follow-up in 1 year.

## 2024-08-03 NOTE — LETTER BODY
[FreeTextEntry1] : Charles Ewing  [address] [place], [place] [id number] [phone]   Dear Dr. Ewing,   Reason for Visit: BPH. Elevated PSA. Urinary frequency.   This is a 69-year-old gentleman with elevated PSA s/p negative prostate biopsy in 2018, symptoms of BPH and urinary frequency. Patient's prostate MRI in Dec 2020 demonstrated an intermediate prostatic lesion, PI-RADS 3. His cystoscopy in January demonstrated a 3 cm prostatic urethra and a small median lobe. The patient's urodynamics testing in Jan demonstrated an overactive bladder without bladder outlet obstruction. He returns today for follow-up. Since he was last seen, the patient obtained a negative prostate biopsy in March of this year. He denies any difficulties after the biopsy. He reports taking Flomax BID regularly without any side effects or difficulties with the medications. The patient notes improved urinary symptoms on medical therapy. The patient expresses concerns of fatigue. The patient is doing well. He denies any gross hematuria or any pain. He denies any history of glaucoma. All other review of systems are negative. Past medical history, family history and social history were unchanged. Medications and allergies were reviewed. He is taking a Statin. He is allergic to Penicillin.    On examination, the patient is in no acute distress. He is alert and oriented follows commands. He has normal mood and affect. He is normocephalic. Neck is supple. Oral no thrush Respirations are unlabored. His abdomen is soft and nontender. Bladder is nonpalpable. No CVA tenderness. Neurologically he is grossly intact. No peripheral edema. Skin without gross abnormality. He has normal male external genitalia. Normal meatus. Bilateral testes are descended intrascrotally and normal to palpation. On rectal examination, there is normal sphincter tone. The prostate is clinically benign without focal induration or nodularity.   His prostate biopsy in March 2024 was negative.   ASSESSMENT: BPH, stable on Flomax. Elevated PSA. Urinary frequency, improved on Oxybutynin 10 mg. Overactive bladder. Fatigue    I counseled the patient. In terms of his BPH, his symptoms are improved on medical therapy. I recommended he continue taking Flomax BID.  I renewed the patient's prescription for Flomax BID today. I encouraged the patient to continue medications regularly as directed. In terms of his elevated PSA, his previous prostate biopsy was negative. He will repeat PSA and BMP to ensure stability. In terms of his concerns for fatigue, I recommended that he obtain testosterone, prolactin, and LH level. Risks and alternatives were discussed. I answered the patient questions. The patient will follow-up in 1 year and will contact me with any questions or concerns. Thank you for the opportunity to participate in the care of Mr. MACE. I will keep you updated on his progress.    Plan: Continue Flomax BID. BMP. PSA. Testosterone. Prolactin. LH levels. Follow-up in 1 year.

## 2024-08-03 NOTE — HISTORY OF PRESENT ILLNESS
[FreeTextEntry1] : Follow-up BPH.  Flomax twice daily.  Follow-up elevated  PSA.  Biopsy negative in March of this year..  Repeat PSA.  Follow-up 1 year.  Patient concerned about fatigue.  Testosterone  Please refer to URO Consult Note.

## 2024-08-03 NOTE — ADDENDUM
[FreeTextEntry1] : Entered by Aidan Joel, acting as scribe for Dr. Shiv Caldwell. The documentation recorded by the scribe accurately reflects the service I personally performed and the decisions made by me.

## 2024-08-03 NOTE — ADDENDUM
[FreeTextEntry1] : Entered by Aidan Joel, acting as scribe for Dr. Shiv Caldwell. The documentation recorded by the scribe accurately reflects the service I personally performed and the decisions made by me.  No restrictions

## 2024-08-19 ENCOUNTER — NON-APPOINTMENT (OUTPATIENT)
Age: 69
End: 2024-08-19